# Patient Record
Sex: MALE | Race: OTHER | HISPANIC OR LATINO
[De-identification: names, ages, dates, MRNs, and addresses within clinical notes are randomized per-mention and may not be internally consistent; named-entity substitution may affect disease eponyms.]

---

## 2021-05-09 ENCOUNTER — TRANSCRIPTION ENCOUNTER (OUTPATIENT)
Age: 61
End: 2021-05-09

## 2021-12-13 ENCOUNTER — TRANSCRIPTION ENCOUNTER (OUTPATIENT)
Age: 61
End: 2021-12-13

## 2021-12-14 ENCOUNTER — EMERGENCY (EMERGENCY)
Facility: HOSPITAL | Age: 61
LOS: 1 days | Discharge: HOME | End: 2021-12-14
Attending: EMERGENCY MEDICINE
Payer: MEDICAID

## 2021-12-14 VITALS
OXYGEN SATURATION: 100 % | HEART RATE: 86 BPM | TEMPERATURE: 98 F | HEIGHT: 70 IN | RESPIRATION RATE: 18 BRPM | SYSTOLIC BLOOD PRESSURE: 102 MMHG | WEIGHT: 199.96 LBS | DIASTOLIC BLOOD PRESSURE: 65 MMHG

## 2021-12-14 PROCEDURE — 99283 EMERGENCY DEPT VISIT LOW MDM: CPT

## 2021-12-14 NOTE — ED PROVIDER NOTE - OBJECTIVE STATEMENT
60yo M taking luggage downstairs and was too heavy and fell down 3 steps, denies head injury, has no complaints. his landlord called EMS for him to be evaluated.

## 2021-12-14 NOTE — ED ADULT TRIAGE NOTE - HEIGHT IN CM
[Born at ___ Wks Gestation] : The patient was born at [unfilled] weeks gestation [] : via normal spontaneous vaginal delivery [[facility]] : at [facility] 177.8 [BW: _____] : weight of [unfilled] [DW: _____] : Discharge weight was [unfilled] [Rubella (Immune)] : Rubella immune [None] : There are no risk factors [HepBsAG] : HepBsAg negative [HIV] : HIV negative [GBS] : GBS negative [VDRL/RPR (Reactive)] : VDRL/RPR nonreactive

## 2021-12-14 NOTE — ED PROVIDER NOTE - PATIENT PORTAL LINK FT
You can access the FollowMyHealth Patient Portal offered by [facility] by registering at the following website: [email or web address]. By joining [facility]’s FollowMyHealth portal, you will also be able to view your health information using other applications (apps) compatible with our system.

## 2021-12-14 NOTE — ED ADULT TRIAGE NOTE - CHIEF COMPLAINT QUOTE
Patient BIBA to ED today from home after fall down about 3 steps today.  Patient denies hitting head or LOC, denies pain.  Patient states he came to hospital cause his landlord called EMS.  Dr. Pierre at bedside.

## 2022-07-02 ENCOUNTER — NON-APPOINTMENT (OUTPATIENT)
Age: 62
End: 2022-07-02

## 2023-01-27 ENCOUNTER — NON-APPOINTMENT (OUTPATIENT)
Age: 63
End: 2023-01-27

## 2023-04-03 ENCOUNTER — OFFICE (OUTPATIENT)
Dept: URBAN - METROPOLITAN AREA CLINIC 94 | Facility: CLINIC | Age: 63
Setting detail: OPHTHALMOLOGY
End: 2023-04-03
Payer: MEDICAID

## 2023-04-03 DIAGNOSIS — H35.033: ICD-10-CM

## 2023-04-03 DIAGNOSIS — H25.13: ICD-10-CM

## 2023-04-03 DIAGNOSIS — H01.012: ICD-10-CM

## 2023-04-03 DIAGNOSIS — H01.015: ICD-10-CM

## 2023-04-03 PROCEDURE — 92012 INTRM OPH EXAM EST PATIENT: CPT | Performed by: OPHTHALMOLOGY

## 2023-04-03 ASSESSMENT — SPHEQUIV_DERIVED
OS_SPHEQUIV: 1.125
OD_SPHEQUIV: -1.75

## 2023-04-03 ASSESSMENT — KERATOMETRY
OS_AXISANGLE_DEGREES: 003
METHOD_AUTO_MANUAL: AUTO
OD_AXISANGLE_DEGREES: 078
OD_K1POWER_DIOPTERS: 42.00
OS_K1POWER_DIOPTERS: 41.75
OS_K2POWER_DIOPTERS: 42.25
OD_K2POWER_DIOPTERS: 42.50

## 2023-04-03 ASSESSMENT — REFRACTION_MANIFEST
OS_VA1: 20/20
OS_ADD: +2.00
OD_VA1: 20/20
OD_VA2: 20/20
OD_ADD: +2.00
OD_SPHERE: +0.75
OS_VA1: 20/20
OS_ADD: +2.00
OS_SPHERE: +1.00
OU_VA: 20/20
OD_VA1: 20/20
OS_SPHERE: +0.75
OD_ADD: +2.00
OS_VA2: 20/20
OD_SPHERE: PL

## 2023-04-03 ASSESSMENT — REFRACTION_CURRENTRX
OD_VPRISM_DIRECTION: PROGS
OD_OVR_VA: 20/
OD_AXIS: 076
OS_OVR_VA: 20/
OD_AXIS: 180
OS_VPRISM_DIRECTION: BF
OD_SPHERE: +1.25
OS_CYLINDER: 0.00
OD_CYLINDER: 0.00
OS_SPHERE: +2.50
OD_CYLINDER: -0.25
OS_ADD: +2.00
OD_OVR_VA: 20/
OS_CYLINDER: SPHERE
OD_ADD: +2.00
OS_AXIS: 180
OS_OVR_VA: 20/
OS_ADD: +2.00
OD_SPHERE: +2.50
OS_OVR_VA: 20/
OD_VPRISM_DIRECTION: SV
OD_SPHERE: +0.75
OS_SPHERE: +0.75
OD_ADD: +2.00
OD_VPRISM_DIRECTION: BF
OS_VPRISM_DIRECTION: SV
OS_SPHERE: +1.25
OD_OVR_VA: 20/
OS_VPRISM_DIRECTION: PROGS

## 2023-04-03 ASSESSMENT — REFRACTION_AUTOREFRACTION
OD_CYLINDER: 0.00
OD_AXIS: 000
OS_AXIS: 110
OS_CYLINDER: -0.25
OD_SPHERE: -1.75
OS_SPHERE: +1.25

## 2023-04-03 ASSESSMENT — LID EXAM ASSESSMENTS
OS_BLEPHARITIS: T
OD_BLEPHARITIS: T

## 2023-04-03 ASSESSMENT — TONOMETRY
OS_IOP_MMHG: 12
OD_IOP_MMHG: 13

## 2023-04-03 ASSESSMENT — CONFRONTATIONAL VISUAL FIELD TEST (CVF)
OS_FINDINGS: FULL
OD_FINDINGS: FULL

## 2023-04-03 ASSESSMENT — VISUAL ACUITY
OD_BCVA: 20/40
OS_BCVA: 20/30+1

## 2023-04-03 ASSESSMENT — AXIALLENGTH_DERIVED
OD_AL: 24.7908
OS_AL: 23.7051

## 2023-06-14 ENCOUNTER — OFFICE (OUTPATIENT)
Dept: URBAN - METROPOLITAN AREA CLINIC 116 | Facility: CLINIC | Age: 63
Setting detail: OPHTHALMOLOGY
End: 2023-06-14
Payer: COMMERCIAL

## 2023-06-14 DIAGNOSIS — H25.13: ICD-10-CM

## 2023-06-14 DIAGNOSIS — H35.033: ICD-10-CM

## 2023-06-14 PROBLEM — E11.3293 DM TYPE 2; BOTH MILD WITHOUT ME: Status: ACTIVE | Noted: 2023-06-14

## 2023-06-14 PROCEDURE — 92014 COMPRE OPH EXAM EST PT 1/>: CPT | Performed by: OPTOMETRIST

## 2023-06-14 ASSESSMENT — REFRACTION_CURRENTRX
OS_AXIS: 180
OD_SPHERE: +2.50
OD_ADD: +2.00
OS_CYLINDER: 0.00
OD_AXIS: 076
OD_VPRISM_DIRECTION: SV
OS_OVR_VA: 20/
OS_SPHERE: +0.75
OS_SPHERE: +2.50
OS_VPRISM_DIRECTION: BF
OD_SPHERE: +1.25
OD_ADD: +2.00
OD_CYLINDER: 0.00
OD_OVR_VA: 20/
OD_VPRISM_DIRECTION: PROGS
OD_AXIS: 180
OS_SPHERE: +1.25
OD_CYLINDER: -0.25
OD_VPRISM_DIRECTION: BF
OS_ADD: +2.00
OS_VPRISM_DIRECTION: SV
OS_VPRISM_DIRECTION: PROGS
OD_OVR_VA: 20/
OS_OVR_VA: 20/
OD_OVR_VA: 20/
OS_CYLINDER: SPHERE
OD_SPHERE: +0.75
OS_OVR_VA: 20/
OS_ADD: +2.00

## 2023-06-14 ASSESSMENT — REFRACTION_AUTOREFRACTION
OD_CYLINDER: -0.25
OS_SPHERE: +1.50
OS_CYLINDER: -0.50
OD_SPHERE: -2.25
OD_AXIS: 057
OS_AXIS: 090

## 2023-06-14 ASSESSMENT — REFRACTION_MANIFEST
OD_ADD: +2.00
OS_SPHERE: +1.00
OD_ADD: +2.25
OS_VA2: 20/20
OD_SPHERE: PL
OD_ADD: +2.00
OD_CYLINDER: SPH
OD_VA1: 20/30+
OS_SPHERE: +1.00
OS_SPHERE: +0.75
OD_VA2: 20/20
OD_VA1: 20/20
OS_VA1: 20/20
OS_ADD: +2.25
OD_VA1: 20/20
OS_ADD: +2.00
OD_SPHERE: +0.75
OD_SPHERE: -1.75
OS_ADD: +2.00
OU_VA: 20/20
OS_CYLINDER: SPH
OS_VA1: 20/20-
OS_VA1: 20/20

## 2023-06-14 ASSESSMENT — KERATOMETRY
OD_AXISANGLE_DEGREES: 073
METHOD_AUTO_MANUAL: AUTO
OD_K2POWER_DIOPTERS: 42.50
OS_AXISANGLE_DEGREES: 102
OD_K1POWER_DIOPTERS: 42.25
OS_K2POWER_DIOPTERS: 42.50
OS_K1POWER_DIOPTERS: 42.25

## 2023-06-14 ASSESSMENT — AXIALLENGTH_DERIVED
OS_AL: 23.5184
OD_AL: 25.0109

## 2023-06-14 ASSESSMENT — TONOMETRY
OD_IOP_MMHG: 11
OS_IOP_MMHG: 11

## 2023-06-14 ASSESSMENT — LID EXAM ASSESSMENTS
OD_BLEPHARITIS: T
OS_BLEPHARITIS: T

## 2023-06-14 ASSESSMENT — CONFRONTATIONAL VISUAL FIELD TEST (CVF)
OS_FINDINGS: FULL
OD_FINDINGS: FULL

## 2023-06-14 ASSESSMENT — VISUAL ACUITY
OS_BCVA: 20/140
OD_BCVA: 20/40

## 2023-06-14 ASSESSMENT — SPHEQUIV_DERIVED
OS_SPHEQUIV: 1.25
OD_SPHEQUIV: -2.375

## 2023-08-14 ENCOUNTER — APPOINTMENT (OUTPATIENT)
Dept: HUMAN REPRODUCTION | Facility: CLINIC | Age: 63
End: 2023-08-14

## 2023-09-05 ENCOUNTER — OFFICE (OUTPATIENT)
Dept: URBAN - METROPOLITAN AREA CLINIC 112 | Facility: CLINIC | Age: 63
Setting detail: OPHTHALMOLOGY
End: 2023-09-05
Payer: MEDICAID

## 2023-09-05 DIAGNOSIS — E11.9: ICD-10-CM

## 2023-09-05 DIAGNOSIS — H35.033: ICD-10-CM

## 2023-09-05 DIAGNOSIS — H25.12: ICD-10-CM

## 2023-09-05 DIAGNOSIS — H25.13: ICD-10-CM

## 2023-09-05 PROCEDURE — 99214 OFFICE O/P EST MOD 30 MIN: CPT | Performed by: OPHTHALMOLOGY

## 2023-09-05 PROCEDURE — 92250 FUNDUS PHOTOGRAPHY W/I&R: CPT | Performed by: OPHTHALMOLOGY

## 2023-09-05 PROCEDURE — 92136 OPHTHALMIC BIOMETRY: CPT | Performed by: OPHTHALMOLOGY

## 2023-09-05 ASSESSMENT — KERATOMETRY
OD_CYLPOWER_DEGREES: 0.5
METHOD_AUTO_MANUAL: AUTO
OD_K2POWER_DIOPTERS: 42.50
OS_AXISANGLE_DEGREES: 12
OD_AXISANGLE_DEGREES: 4
OD_AXISANGLE_DEGREES: 094
OS_K1POWER_DIOPTERS: 42.25
OD_K1POWER_DIOPTERS: 42.00
OS_CYLPOWER_DEGREES: 0.25
OS_K1K2_AVERAGE: 42.375
OS_CYLAXISANGLE_DEGREES: 102
OD_AXISANGLE2_DEGREES: 094
OS_AXISANGLE2_DEGREES: 102
OD_CYLAXISANGLE_DEGREES: 094
OS_K2POWER_DIOPTERS: 42.50
OD_K1K2_AVERAGE: 42.25
OD_K1POWER_DIOPTERS: 42.00
OS_K2POWER_DIOPTERS: 42.50
OS_K1POWER_DIOPTERS: 42.25
OS_AXISANGLE_DEGREES: 102
OD_K2POWER_DIOPTERS: 42.50

## 2023-09-05 ASSESSMENT — REFRACTION_MANIFEST
OD_VA2: 20/20
OS_VA1: 20/20
OS_ADD: +2.25
OS_VA1: 20/20
OD_ADD: +2.25
OU_VA: 20/20
OD_SPHERE: PL
OD_CYLINDER: SPH
OS_SPHERE: +1.00
OD_VA1: 20/20
OD_SPHERE: -1.75
OD_SPHERE: +0.75
OS_CYLINDER: SPH
OS_SPHERE: +0.75
OD_VA1: 20/30-2
OD_SPHERE: -2.25
OD_VA1: 20/30+
OS_VA1: 20/20-
OD_VA1: 20/20
OD_ADD: +2.00
OS_ADD: +2.00
OD_ADD: +2.00
OS_SPHERE: +1.00
OS_CYLINDER: SPH
OD_CYLINDER: SPH
OS_VA1: 20/25-2
OS_ADD: +2.00
OS_SPHERE: +1.00
OS_VA2: 20/20

## 2023-09-05 ASSESSMENT — REFRACTION_CURRENTRX
OS_SPHERE: +1.25
OD_ADD: +2.00
OS_CYLINDER: SPHERE
OD_ADD: +2.25
OS_OVR_VA: 20/
OS_AXIS: 180
OD_CYLINDER: 0.00
OS_VPRISM_DIRECTION: BF
OD_VPRISM_DIRECTION: PROGS
OD_VPRISM_DIRECTION: BF
OS_VPRISM_DIRECTION: PROGS
OD_ADD: +2.00
OD_OVR_VA: 20/
OS_OVR_VA: 20/
OS_ADD: +2.00
OS_SPHERE: +1.25
OD_VPRISM_DIRECTION: PROGS
OS_VPRISM_DIRECTION: PROGS
OD_AXIS: 180
OD_CYLINDER: -0.25
OS_SPHERE: +0.75
OS_CYLINDER: 0.00
OD_SPHERE: +0.75
OD_SPHERE: -2.50
OD_AXIS: 076
OS_ADD: +2.00
OS_CYLINDER: -1.25
OS_OVR_VA: 20/
OD_AXIS: 000
OS_ADD: +1.75
OD_OVR_VA: 20/
OS_AXIS: 132
OD_SPHERE: +1.25
OD_CYLINDER: 0.00
OD_OVR_VA: 20/

## 2023-09-05 ASSESSMENT — SPHEQUIV_DERIVED
OD_SPHEQUIV: -2.625
OS_SPHEQUIV: 1

## 2023-09-05 ASSESSMENT — REFRACTION_AUTOREFRACTION
OD_AXIS: 052
OD_SPHERE: -2.50
OS_SPHERE: +1.25
OD_CYLINDER: -0.25
OS_AXIS: 092
OS_CYLINDER: -0.50

## 2023-09-05 ASSESSMENT — CONFRONTATIONAL VISUAL FIELD TEST (CVF)
OS_FINDINGS: FULL
OD_FINDINGS: FULL

## 2023-09-05 ASSESSMENT — AXIALLENGTH_DERIVED
OS_AL: 23.6155
OD_AL: 25.1731

## 2023-09-05 ASSESSMENT — TONOMETRY
OS_IOP_MMHG: 12
OD_IOP_MMHG: 13

## 2023-09-05 ASSESSMENT — VISUAL ACUITY
OD_BCVA: 20/30
OS_BCVA: 20/40

## 2023-09-05 ASSESSMENT — LID EXAM ASSESSMENTS
OD_BLEPHARITIS: T
OS_BLEPHARITIS: T

## 2024-07-16 NOTE — ED PROVIDER NOTE - TOBACCO USE
Goal Outcome Evaluation:           Progress: improving  Outcome Evaluation: Pt A&O x4, Room air, Up with one assist to BR/ambulate in hallway. Tele in place. Wife and other family members at bedside and attentive to pt, assists with ADL's. Voiding via BR. Had BM today. Had Shower early this AM. NIH - 2. Call light within reach. Bed alarm in use.                                Detail Level: Zone Never smoker